# Patient Record
Sex: FEMALE | Race: WHITE | NOT HISPANIC OR LATINO | Employment: PART TIME | URBAN - METROPOLITAN AREA
[De-identification: names, ages, dates, MRNs, and addresses within clinical notes are randomized per-mention and may not be internally consistent; named-entity substitution may affect disease eponyms.]

---

## 2019-06-03 ENCOUNTER — OFFICE VISIT CONVERTED (OUTPATIENT)
Dept: SURGERY | Facility: CLINIC | Age: 19
End: 2019-06-03
Attending: SURGERY

## 2019-06-12 ENCOUNTER — HOSPITAL ENCOUNTER (OUTPATIENT)
Dept: ULTRASOUND IMAGING | Facility: HOSPITAL | Age: 19
Discharge: HOME | End: 2019-06-12
Attending: SURGERY

## 2019-06-14 LAB — CONV LYMPHOMA/LEUKEMIA PROFILE SOLID TISSUE OR FLUID: NORMAL

## 2020-08-24 ENCOUNTER — HOSPITAL ENCOUNTER (OUTPATIENT)
Dept: ULTRASOUND IMAGING | Facility: HOSPITAL | Age: 20
Discharge: HOME | End: 2020-08-24
Attending: SURGERY

## 2020-08-31 ENCOUNTER — OFFICE VISIT CONVERTED (OUTPATIENT)
Dept: SURGERY | Facility: CLINIC | Age: 20
End: 2020-08-31
Attending: SURGERY

## 2021-03-01 ENCOUNTER — HOSPITAL ENCOUNTER (OUTPATIENT)
Dept: ULTRASOUND IMAGING | Facility: HOSPITAL | Age: 21
Discharge: HOME | End: 2021-03-01
Attending: SURGERY

## 2021-03-08 ENCOUNTER — OFFICE VISIT CONVERTED (OUTPATIENT)
Dept: SURGERY | Facility: CLINIC | Age: 21
End: 2021-03-08
Attending: SURGERY

## 2021-03-19 ENCOUNTER — HOSPITAL ENCOUNTER (OUTPATIENT)
Dept: OTHER | Facility: HOSPITAL | Age: 21
Discharge: HOME | End: 2021-03-19
Attending: NURSE PRACTITIONER

## 2021-05-13 NOTE — PROGRESS NOTES
Progress Note      Patient Name: Alessandra Gross   Patient ID: [id number]   Sex: Female   YOB: 2000    Primary Care Provider: Dana SANCHES   Referring Provider: Dana SANCHES    Visit Date: August 31, 2020    Provider: Shiraz Mcconnell MD   Location: [facility]   Location Address: [address] [place] [address]  [id number]   Location Phone: [phone]          Chief Complaint  · Enlarged Lymph Node      History Of Present Illness  Alessandra Gross is a 19 year old /White female who presents to the office today as a consult from Dana SANCHES.      Patient is here for follow-up after I last saw her in June 2019 for enlarged lymph node on the right side of her neck.  A copy and paste of the HPI section from my office visit on that day is available below.  The plan after that office visit was to have the patient get a radiology guided biopsy of the lymph node.  That was done and the pathology was benign.  Plan was to get an ultrasound 1 year later and follow-up with me.  Follow-up ultrasound was done on 8/24/2020.  The report indicates that there is a hypoechoic lesion at the right neck measuring 1.7 x 0.8 x1.3cm and on prior ultrasound from 4/22/2019 the lesion was 1.2 x 0.6 x 1.1cm.  Ultrasound results were discussed with the patient.  On exam, there is a small, nontender, freely mobile lymph node palpable at the right upper neck area.  Patient does not have any lymph nodes palpable at any other areas.  We again discussed the options of watchful observation, radiology guided biopsy, and excisional biopsy and decided together to proceed with watchful observation with repeat ultrasound in 6 months given that the node did slightly increase in size.  Right neck ultrasound will be ordered to be done 6 months from now and the patient will follow-up with me again shortly after the ultrasound is done.    Copy and Paste of HPI section from Office Visit on  "6/3/19  Patient is a 18-year-old female referred for evaluation for difficulty swallowing and an enlarged lymph node.  Patient says when she eats she feels like she needs to drink water otherwise her food gets stuck high up in her throat.  She has not had any nausea or vomiting and she has not had a urge to take any of her food.  She does not have any noticeable heartburn or reflux.  She was placed on an acid medication about 1 month ago and it has not helped any yet.  Also, the patient had an ultrasound done on her neck and it showed a lymph node with a size of 1.1 x 1.2 x 0.6 cm on the right side of the neck.  The lymph node is palpable.  The lymph node was first noticed about 6 months ago.  It has not increased in size.  The patient and her father are concerned about the lymph node because the patient's paternal grandmother had non-Hodgkin's lymphoma.  Patient has not had any fevers.  She has not had any unintended weight loss.  The ultrasound was done at the [facility] in [place].            Vitals  Date Time BP Position Site L\R Cuff Size HR RR TEMP (F) WT  HT  BMI kg/m2 BSA m2 O2 Sat        08/31/2020 09:50 AM       14  149lbs 0oz 5'  7\" 23.34 1.79           Physical Examination  · Constitutional  o Appearance  o : healthy appearing, alert and in no acute distress, reliable historian  · Head and Face  o Head  o :   § Inspection  § : no visable deformities or lesions  · Eyes  o Conjunctivae  o : clear  o Sclerae  o : clear  · Neck  o Inspection/Palpation  o : normal appearance, no masses, trachea midline  · Respiratory  o Respiratory Effort  o : breathing unlabored, respiratory effort appears normal  o Inspection of Chest  o : normal appearance, no retractions  · Cardiovascular  o Heart  o : regular rate and rhythm  · Gastrointestinal  o Abdominal Examination  o :   § Abdomen  § : soft, nontender, nondistended  · Skin and Subcutaneous Tissue  o General Inspection  o : no visible concerning rashes or " lesions present  · Neurologic  o Cranial Nerves  o : no obvious motor deficits  o Sensation  o : no obvious sensory deficits  o Gait and Station  o :   § Gait Screening  § : normal gait, able to stand without diffculty  o Cerebellar Function  o : no obvious abnormalities  · Psychiatric  o Judgement and Insight  o : judgment and insight intact  o Mood and Affect  o : mood normal, affect appropriate          Assessment  · Enlarged lymph node in neck (right side)     785.6/R59.0      Plan  · Orders  o Neck US (98861) - 785.6/R59.0 - 03/01/2021   at [facility] on 3/1/20 at 1:30pm  · Medications  o Medications have been Reconciled  o Transition of Care or Provider Policy  · Instructions  o Electronically Identified Patient Education Materials Provided Electronically            Electronically Signed by: Shiraz Mcconnell MD -Author on August 31, 2020 10:07:26 AM

## 2021-05-14 VITALS — RESPIRATION RATE: 14 BRPM | WEIGHT: 149 LBS | BODY MASS INDEX: 23.39 KG/M2 | HEIGHT: 67 IN

## 2021-05-14 VITALS — RESPIRATION RATE: 14 BRPM | WEIGHT: 156.5 LBS | HEIGHT: 67 IN | BODY MASS INDEX: 24.56 KG/M2

## 2021-05-14 NOTE — PROGRESS NOTES
Progress Note      Patient Name: Alessandra Gross   Patient ID: [id number]   Sex: Female   YOB: 2000    Primary Care Provider: Dana SANCHES   Referring Provider: Dana SANCHES    Visit Date: March 8, 2021    Provider: Shiraz Mcconnell MD   Location: [facility]   Location Address: [address] [place] [address]  [id number]   Location Phone: [phone]          Chief Complaint  · Follow Up Visit      History Of Present Illness  Alessandra Gross is a 20 year old /White female who presents today for a postoperative visit.      Patient is here for follow-up after I last saw her on 8/21/20 for an enlarged lymph node at the right side of her neck.  A copy and paste of the HPI section from my office visit on that day is available below.  Repeat ultrasound was done on 3/1/2021 and the lymph node at the right side of the neck is now 1.5 x 1.1 x 0.8 cm in size and on prior exam the lymph node was 1.7 x 0.8 x 1.3 cm in size.  Patient has no complaints.  She cannot feel the lymph node in her neck right now.  She says it comes and goes.  I can not palpate any cervical lymph nodes on exam today.  We again discussed the options of watchful observation, radiology guided biopsy, and excisional biopsy in a decided together to proceed with watchful observation with repeat ultrasound 1 year from now.  Notably, the lymph node has already been biopsied.  Ultrasound-guided biopsy was done on 6/12/2019 and showed only benign lymph node tissue.  The ultrasound will be scheduled 1 year from now and the patient can follow-up with me a week or 2 after the ultrasound is done.    Copy and Paste of HPI section from Office Visit on 8/21/20   Patient is here for follow-up after I last saw her in June 2019 for enlarged lymph node on the right side of her neck. A copy and paste of the HPI section from my office visit on that day is available below. The plan after that office visit was  to have the patient get a radiology guided biopsy of the lymph node. That was done and the pathology was benign. Plan was to get an ultrasound 1 year later and follow-up with me. Follow-up ultrasound was done on 8/24/2020. The report indicates that there is a hypoechoic lesion at the right neck measuring 1.7 x 0.8 x1.3 cm and on prior ultrasound from 4/22/2019 the lesion was 1.2 x 0.6 x 1.1cm. Ultrasound results were discussed with the patient. On exam, there is a small, nontender, freely mobile lymph node palpable at the right upper neck area. Patient does not have any lymph nodes palpable at any other areas. We again discussed the options of watchful observation, radiology guided biopsy, and excisional biopsy and decided together to proceed with watchful observation with repeat ultrasound in 6 months given that the node did slightly increase in size. Right neck ultrasound will be ordered to be done 6 months from now and the patient will follow-up with me again shortly after the ultrasound is done.     Copy and Paste of HPI section from Office Visit on 6/3/19   Patient is a 18-year-old female referred for evaluation for difficulty swallowing and an enlarged lymph node. Patient says when she eats she feels like she needs to drink water otherwise her food gets stuck high up in her throat. She has not had any nausea or vomiting and she has not had a urge to take any of her food. She does not have any noticeable heartburn or reflux. She was placed on an acid medication about 1 month ago and it has not helped any yet. Also, the patient had an ultrasound done on her neck and it showed a lymph node with a size of 1.1 x 1.2 x 0.6 cm on the right side of the neck. The lymph node is palpable. The lymph node was first noticed about 6 months ago. It has not increased in size. The patient and her father are concerned about the lymph node because the patient's paternal grandmother had non-Hodgkin's lymphoma. Patient has not had  "any fevers. She has not had any unintended weight loss. [facility] [place].            Past Medical History  Disease Name Date Onset Notes   Hyperthyroidism --  --    Mood disorder --  --          Past Surgical History  Procedure Name Date Notes   Adenoidectomy --  --    Ear Tubes --  --          Medication List  Name Date Started Instructions   Celexa 20 mg oral tablet  take 1 tablet (20 mg) by oral route once daily         Allergy List  Allergen Name Date Reaction Notes   NO KNOWN DRUG ALLERGIES --  --  --          Family Medical History  Disease Name Relative/Age Notes   Renal Calculus Mother/   Mother   Family history of colon cancer Grandfather (maternal)/30s   Grandfather (maternal)/30s   Family history of breast cancer Grandmother (maternal)/70s   Grandmother (maternal)/70s   -Father's Family History Unknown Father/   Father         Social History  Finding Status Start/Stop Quantity Notes   Alcohol Never --/-- --  drinks no   Caffeine Current every day --/-- --  drinks regularly; tea and soft drinks; 3-4 times per day   Second hand smoke exposure Never --/-- --  no   Tobacco Never --/-- --  never a smoker         Review of Systems  · Constitutional  o Denies  o : fever, chills  · Cardiovascular  o Denies  o : chest pain on exertion  · Respiratory  o Denies  o : shortness of breath, cough  · Gastrointestinal  o Denies  o : nausea, vomiting      Vitals  Date Time BP Position Site L\R Cuff Size HR RR TEMP (F) WT  HT  BMI kg/m2 BSA m2 O2 Sat FR L/min FiO2 HC       03/08/2021 09:33 AM       14  156lbs 8oz 5'  7\" 24.51 1.83                 Assessment  · Enlarged lymph node in neck     785.6/R59.0      Plan  · Medications  o Medications have been Reconciled  o Transition of Care or Provider Policy  · Instructions  o See Above HPI section.            Electronically Signed by: Shiraz Mcconnell MD -Author on March 8, 2021 09:46:56 AM  "

## 2021-05-15 VITALS — BODY MASS INDEX: 23.46 KG/M2 | RESPIRATION RATE: 14 BRPM | WEIGHT: 149.5 LBS | HEIGHT: 67 IN

## 2021-05-23 ENCOUNTER — TRANSCRIBE ORDERS (OUTPATIENT)
Dept: SURGERY | Facility: CLINIC | Age: 21
End: 2021-05-23

## 2021-05-23 DIAGNOSIS — R59.9 ENLARGED LYMPH NODE: Primary | ICD-10-CM

## 2021-08-01 PROBLEM — E05.90 HYPERTHYROIDISM: Status: ACTIVE | Noted: 2021-08-01

## 2022-03-07 ENCOUNTER — APPOINTMENT (OUTPATIENT)
Dept: ULTRASOUND IMAGING | Facility: HOSPITAL | Age: 22
End: 2022-03-07

## 2022-03-15 ENCOUNTER — TELEPHONE (OUTPATIENT)
Dept: SURGERY | Facility: CLINIC | Age: 22
End: 2022-03-15

## 2022-03-21 NOTE — TELEPHONE ENCOUNTER
Tried calling pt, no answer. LMOM.  Cj, we have tried calling patient several times with no answer. Pascale seen Dr. Mcconnell for f/u on 3-8-2021 for enlarged lymph nodes in neck. He ordered another US to be done 1 yr from then. The patient did not have done and we can't get ahold of her, what should we do?